# Patient Record
Sex: MALE | Race: ASIAN | URBAN - METROPOLITAN AREA
[De-identification: names, ages, dates, MRNs, and addresses within clinical notes are randomized per-mention and may not be internally consistent; named-entity substitution may affect disease eponyms.]

---

## 2023-02-21 ENCOUNTER — TELEPHONE ENCOUNTER (OUTPATIENT)
Dept: URBAN - METROPOLITAN AREA CLINIC 92 | Facility: CLINIC | Age: 58
End: 2023-02-21

## 2023-02-21 PROBLEM — 55341000119107: Status: ACTIVE | Noted: 2023-02-21

## 2023-02-21 NOTE — HPI-TODAY'S VISIT:
Patient is a 57-year-old male coming in for evaluation of liver disease issues at the request of Dr. Prudencio Julien MD. A copy of the note will be sent to the referring provider. Approximately 40 pages of material was reviewed. The patient is listed as having had a recent gastrointestinal illness on their February 21, 2023 visit with the primary while in [place] during the week of December.  Patient saw Dr. Jimenez on December 25 and was given a antibiotic that appeared to be cefixime 3 times a day for 5 days.  No appetite of been noted no nausea or diarrhea been noted but did have some bloating issues.  He saw Dr. Hunt and had some labs done and stool studies were negative and liver tests were up a little bit.  He had an ultrasound of the liver that was done that was reported to be negative. He was placed on Protonix and metronidazole but was not using the metronidazole.  Symptoms were improving. Home COVID test have been negative. He also had upper respiratory symptoms after returning.  Unclear if he had acquired something else. 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 Past surgeries include wisdom teeth extraction 1988. Medicines include pantoprazole 40 mg a day, levocarnitine 330 mg a day, lisinopril hydrochlorothiazide 20/12.5 once a day, multivitamin 50+ a day, Nasarel 200 mg a day, co-Q10 60 mg a day, aspirin 81 mg a day, B12 2500 mcg sublingual a day, Flonase 50 mcg nasal spray as needed. Allergies are to Cipro and penicillin class. Patient BMI listed as being obese at 30.9 with a weight of 191 and height of 5 foot 6. January 5, 2023 labs showed sodium 141 potassium 3.9 chloride 102 CO2 of 32 BUN of 20 creatinine 0.8 albumin 4.2 bilirubin 0.7 AST 69 ALT 63 alk phos 82 white cell count 6.7 hemoglobin 16.2 platelet count 377. January 5, 2023 ultrasound showed patient had a normal size liver contour and echogenicity no masses.  Main portal vein and hepatic veins patent.  Bile ducts were normal common bile duct 4 mm.  Gallbladder was normal with no gallstones.  Pancreas head and body appeared normal tail not seen due to gas.  Right kidney 12.3 cm with no hydronephrosis.  Thin-walled anechoic cyst seen in the lateral cortex of the right kidney measuring 1.8 x 1.2 x 1.5 cm.  No stones.  Left kidney 13.9 cm within walled cyst noted 5.2 x 4.2 x 5.2 cm.  Spleen was normal size.  No ascites was seen.  The cysts were felt to be simple..  It appears that the provider ordered a ferritin, ceruloplasmin alpha-1, iron sat, a COVID screen. They suspected a possible infection versus other effect. They had requested an EGD consideration. [facility] note of February 21, 2023 with results now show that the alpha-1 level was normal at 145.  Ceruloplasmin was 22.  C. difficile toxin/PCR was negative.  We saw additional labs from February 20, 2023 that showed an albumin of 4.1 alkaline phosphatase 217 AST that was noted to be 812 ALT 2280 total bilirubin 3.8. January 13, 2023 labs showed AST 30 ALT 59 alk phos 75 albumin 4.1 bilirubin 0.4. January 5, 2023 labs showed bilirubin 0.7 AST 69 ALT 63 alk phos 82. January 3, 2023 labs showed bilirubin 0.5 AST 51 ALT 51 alk phos 78. September 2, 2022 labs showed bilirubin 0.5 AST 17 ALT 24 alk phos 78.  Clearly a change noted. Other GI February 20th 2023 labs showed calcium 9.5 chloride 104 CO2 28 creatinine 0.9 potassium 4.3 sodium 138 and total protein 7.3. January 17 ferritin level was normal at 119.96. January 17, 2023 iron sat was normal at 23%.  January 17 INR was 0.91. Stool study January 4 showed Giardia lamblia antigen not seen and cryptosporidium antigen not detected.  Hep A IgM was negative hep B surface antigen was negative.  Hep C antibody was negative.  Lipase was 26 antimitochondrial antibody was less than 20.  Ova and parasite test was noted to be negative from January 4. Stool studies for Salmonella, Shigella, Campylobacter and Shiga toxin were negative also from January 4. January 5, 2023 ultrasound showed normal-sized liver contour and echogenicity no masses.  Main portal vein hepatic vein patent with appropriate flow, bile duct was 4 mm gallbladder was normal with no stones.  Pancreas head and body normal tail not seen.  Right kidney 12.3 cm with 1.8 cm cyst left kidney 13.9 cm with 5.2 cm cyst.  Spleen normal size.  Cyst felt to be simple. There is a note here from Dr. Dwight Lucia the mentions that the patient has a mitochondrial disorder and a secondary muscle weakness and fatigue and history of rhabdomyolysis.  This note was from October 23, 2013.  They felt that he had stabilized with the introduction of coenzyme Q 10 and COVID-19. I saw a note on January 6, 2023 from Dr. uLcia where she felt that the patient should avoid Cipro and Flagyl and Protonix should be okay with his condition.   1.	Abnormal liver labs/acute hepatitis clearly noted.  Patient with short incubation period felt ill in [place].  Was given some antibiotics for some GI symptoms.  Then developed some respiratory issues.  Mild bump in lab seen which led to his initial work-up that was negative and then has developed even worse labs now.  Certainly possible for medicines to cause injury in people who have mitochondrial issues but at the same time certainly possible also since he traveled to [place] that he could have acquired hepatitis E and that has a short incubation period of 30 or so days and can certainly cause an acute hepatitis that mimics many of the hep A pattern and is certainly endemic to that area of the world.  Needs hepatitis E testing as well as repeat labs, calculate MELD score, see if the labs are peak and start to come down.  If the labs are worsening needs to be admitted to the hospital and monitored inpatient.  If the labs are not worsening but remained with MELD score above 15 needs to be followed by the transplant team and assess from there accordingly.  Autoimmune screens can be done as well as quant immunoglobulins but the story is very suspicious to me for viral syndrome.  Can definitely check for same as well.  Other screens have already been done extensively by the team locally. 2.	Carnitine deficiency due to inborn error of metabolism history noted.  Patient with a mitochondrial issue and has been seen by Dr. Dwight Lucia for this and is on co-Q10 as well as carnitine's for same.  Certainly possibly of a mitochondrial issue that certain medications can interact and antibiotics are generally in the group of medicines he could do so.  Seems a little bit prolonged to have taken this long to manifest to me but certainly possible.  Follow-up laboratories. 3.	High risk medicine usage noted and on recent antibiotics and is clearly some type of delayed antibiotic reaction worsened by some medical history mitochondrial issues or if the patient has independent of same and acute viral hepatitis indigenous to the area [place] that he was visited. 4.	Vaccine counseling needed check for hep a and B immunity long-term consider vaccination for same. 5.	Occipital neuralgia history noted in chart. 6.	Upper respiratory symptoms noted and patient did COVID tests at home were negative and primary care doctor ordered COVID test the other day as well.  Unlikely for COVID to be manifesting with such a degree of hepatitis issue.  Seem more likely to be something he would have obtained closer to the event of the travel. 7.	Gastrointestinal infection and symptomatology likely could have been related to the hepatitis E and certainly possible.  May have led to the additional work-up as other stool studies were negative. 8.	Prostatitis history listed in chart. 9.	GI screening deferred to local doctors. 10.	Liver imaging noted to be negative for other acute findings and vessels patent. Plan 1.  Repeat labs to see if the trend is going up or down and do MELD score. 2.  While medication effects certainly are possible, patient also could be having hepatitis C which is not uncommon in the part of the world where he is at and it can cause a gastrointestinal illness and an acute hepatitis.  It has a short incubation period similar to what the patient is experiencing.  Treatment for this is generally supportive.  Hepatitis E testing is available. 3.  If labs are worsening or MELD is rising, needs to be seen by the transplant team and followed as there are rare cases of hepatitis E or other viral illnesses or medicine reactions that can potentially cause liver failure and those are important to be monitored for. 4.  By imaging does not appear that any chronic diseases present.   Duration of the visit was 0 min with 20 min of chart prep and 20 minutes for this face to face/TeleMed visit with time reviewing their prior and recent records and labs and discussing their current status and future plans for care for the patient.

## 2023-02-22 ENCOUNTER — LAB OUTSIDE AN ENCOUNTER (OUTPATIENT)
Dept: URBAN - METROPOLITAN AREA CLINIC 86 | Facility: CLINIC | Age: 58
End: 2023-02-22

## 2023-02-22 ENCOUNTER — WEB ENCOUNTER (OUTPATIENT)
Dept: URBAN - METROPOLITAN AREA CLINIC 86 | Facility: CLINIC | Age: 58
End: 2023-02-22

## 2023-02-22 ENCOUNTER — OFFICE VISIT (OUTPATIENT)
Dept: URBAN - METROPOLITAN AREA CLINIC 86 | Facility: CLINIC | Age: 58
End: 2023-02-22
Payer: SELF-PAY

## 2023-02-22 ENCOUNTER — TELEPHONE ENCOUNTER (OUTPATIENT)
Dept: URBAN - METROPOLITAN AREA CLINIC 92 | Facility: CLINIC | Age: 58
End: 2023-02-22

## 2023-02-22 VITALS
TEMPERATURE: 97.2 F | BODY MASS INDEX: 29.73 KG/M2 | WEIGHT: 185 LBS | HEIGHT: 66 IN | DIASTOLIC BLOOD PRESSURE: 86 MMHG | HEART RATE: 72 BPM | SYSTOLIC BLOOD PRESSURE: 139 MMHG

## 2023-02-22 DIAGNOSIS — Z79.899 HIGH RISK MEDICATION USE: ICD-10-CM

## 2023-02-22 DIAGNOSIS — E72.20 HYPERAMMONEMIA: ICD-10-CM

## 2023-02-22 DIAGNOSIS — R74.8 ABNORMAL LIVER ENZYMES: ICD-10-CM

## 2023-02-22 DIAGNOSIS — Z71.89 VACCINE COUNSELING: ICD-10-CM

## 2023-02-22 DIAGNOSIS — E88.40 MITOCHONDRIAL DISEASE: ICD-10-CM

## 2023-02-22 DIAGNOSIS — M54.81 OCCIPITAL NEURALGIA: ICD-10-CM

## 2023-02-22 DIAGNOSIS — J06.9 UPPER RESPIRATORY INFECTION: ICD-10-CM

## 2023-02-22 DIAGNOSIS — B17.9 ACUTE HEPATITIS: ICD-10-CM

## 2023-02-22 DIAGNOSIS — N41.9 PROSTATITIS: ICD-10-CM

## 2023-02-22 DIAGNOSIS — A09 GASTROINTESTINAL INFECTION: ICD-10-CM

## 2023-02-22 PROBLEM — 715852004 GASTROINTESTINAL INFECTION: Status: ACTIVE | Noted: 2023-02-21

## 2023-02-22 PROBLEM — 9360008 HYPERAMMONEMIA: Status: ACTIVE | Noted: 2023-02-22

## 2023-02-22 PROBLEM — 71760005 OCCIPITAL NEURALGIA: Status: ACTIVE | Noted: 2023-02-21

## 2023-02-22 PROBLEM — 37871000 ACUTE HEPATITIS: Status: ACTIVE | Noted: 2023-02-21

## 2023-02-22 PROBLEM — 54150009 UPPER RESPIRATORY INFECTION: Status: ACTIVE | Noted: 2023-02-21

## 2023-02-22 PROBLEM — 166643006 LIVER ENZYMES ABNORMAL: Status: ACTIVE | Noted: 2023-02-21

## 2023-02-22 PROBLEM — 161646004 H/O: HIGH RISK MEDICATION: Status: ACTIVE | Noted: 2023-02-21

## 2023-02-22 PROBLEM — 240096000 MITOCHONDRIAL DISEASE: Status: ACTIVE | Noted: 2023-02-21

## 2023-02-22 PROBLEM — 409063005 COUNSELING: Status: ACTIVE | Noted: 2023-02-21

## 2023-02-22 PROBLEM — 9713002 PROSTATITIS: Status: ACTIVE | Noted: 2023-02-21

## 2023-02-22 PROCEDURE — 99245 OFF/OP CONSLTJ NEW/EST HI 55: CPT

## 2023-02-22 RX ORDER — LISINOPRIL AND HYDROCHLOROTHIAZIDE 20; 12.5 MG/1; MG/1
1 TABLET TABLET ORAL ONCE A DAY
Status: ACTIVE | COMMUNITY

## 2023-02-22 RX ORDER — PANTOPRAZOLE SODIUM 40 MG/1
1 TABLET TABLET, DELAYED RELEASE ORAL TWICE A DAY
Status: ON HOLD | COMMUNITY

## 2023-02-22 RX ORDER — MECOBALAMIN 5000 MCG
0.5 LOZENGE ORAL PRN
Status: ACTIVE | COMMUNITY

## 2023-02-22 RX ORDER — PHENOL 1.4 %
AS DIRECTED AEROSOL, SPRAY (ML) MUCOUS MEMBRANE
Status: ACTIVE | COMMUNITY

## 2023-02-22 RX ORDER — LEVOCARNITINE 330 MG/1
3 TABLETS TABLET ORAL TWICE A DAY
Status: ACTIVE | COMMUNITY

## 2023-02-22 RX ORDER — GINSENG 100 MG
2 CAPSULE ORAL QD
Status: ACTIVE | COMMUNITY

## 2023-02-22 RX ORDER — FLUTICASONE PROPIONATE 50 UG/1
1 SPRAY IN EACH NOSTRIL SPRAY, METERED NASAL ONCE A DAY
Status: ACTIVE | COMMUNITY

## 2023-02-22 NOTE — HPI-TODAY'S VISIT:
Dear Roger Ni, Feb 22: Labs came back showing a sodium 137 potassium 4.1 chloride 103 CO2 27 BUN of 14 creatinine 0.8 calcium 9.5 glucose normal at 86 albumin 3.9 alkaline phosphatase 198  ALT 1368 total bilirubin 2.3 which was elevated.  No comparison, back on February 20 your AST had been 812 and ALT 2280 and total bilirubin 3.8 so they are clearly dropping. WBC was 4.8 hemoglobin 15.5 hematocrit up a little 48.2 platelet  count 320 MCV 87.8.  INR normal at 0.99 which is very important.  Your ammonia level was normal at 53. Neutrophils were 2.44 lymphocytes 1.88. Meld 10 and meld na 10. We also see the other test that are pending which include the hep B immunity as well as hep A immunity checks, the alpha-1 phenotype, the hep C screen, and the hepatitis E test as well. Spoke with pt re the labs and reviewed the labs with him and the trends. He will follow. Dr Lima Addendum: #1 Dear Roger Ni, More labs back from February 22.  Hep A IgM negative and had a immunity noted.  Unclear if this was from prior hep A vaccination or natural exposure. Hep B immunity noted at 5898. Other tests still pending. Dr. Lima

## 2023-02-22 NOTE — HPI-TODAY'S VISIT:
Patient is a 57-year-old male coming in for evaluation of liver disease issues at the request of Dr. Prudencio Julien MD.  A copy of the note will be sent to the referring provider.  Approximately 40 pages of material was reviewed. The patient is listed as having had a recent gastrointestinal illness end of dec 2022 and mentioned to their doctor here. While in [place] during the week of December.  Local doctor in [place] gave some abx and no sulfa due to mitochondrial issue and hydrated and felt better. Back and saw primary. Had been given a antibiotic that appeared to be cefixime 3 times a day for 5 days. Still stomach issues and lfts up and gave ppi and sent Dr Blum at . Had some bloating issues.    He saw Dr. Hunt and had some labs done and looked little lower and stool studies were negative and liver tests were still up a little bit. Stopped protonix pst 10 days. Feeling better.  He had an ultrasound of the liver that was done that was reported to be negative.  He was placed on Protonix and metronidazole but was not using the metronidazole.  Symptoms were improving.  Home COVID test have been negative.  pcr also neg.  He also had upper respiratory symptoms after returning.    Then went to [place] last week and says was cold and mitochondrial issue gets worse muscles with cold and not usual symptoms and fatigue and went back in last week and saw doctor covering and did labs and labs up.  Cpk was done and negative per pt. So sent in Dr Hillman told him to come see for this.  Had street food.  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  Past surgeries include wisdom teeth extraction 1988. Colonoscopy april 12 2023. He did cologuard.  Medicines include pantoprazole 40 mg a day, levocarnitine 330 mg a day, lisinopril hydrochlorothiazide 20/12.5 once a day, multivitamin 50+ a day, Nasarel 200 mg a day, co-Q10 60 mg a day, aspirin 81 mg a day, B12 2500 mcg sublingual a day, Flonase 50 mcg nasal spray as needed.  Allergies are to Cipro and penicillin class.  Patient BMI listed as being obese at 30.9 with a weight of 191 and height of 5 foot 6.  Losing weight on purpose and stomach issues.  January 5, 2023 labs showed sodium 141 potassium 3.9 chloride 102 CO2 of 32 BUN of 20 creatinine 0.8 albumin 4.2 bilirubin 0.7 AST 69 ALT 63 alk phos 82 white cell count 6.7 hemoglobin 16.2 platelet count 377.  January 5, 2023 ultrasound showed patient had a normal size liver contour and echogenicity no masses.  Main portal vein and hepatic veins patent.  Bile ducts were normal common bile duct 4 mm.  Gallbladder was normal with no gallstones.  Pancreas head and body appeared normal tail not seen due to gas.  Right kidney 12.3 cm with no hydronephrosis.  Thin-walled anechoic cyst seen in the lateral cortex of the right kidney measuring 1.8 x 1.2 x 1.5 cm.  No stones.  Left kidney 13.9 cm within walled cyst noted 5.2 x 4.2 x 5.2 cm.  Spleen was normal size.  No ascites was seen.  The cysts were felt to be simple.  It appears that the provider ordered a ferritin, ceruloplasmin alpha-1, iron sat, a COVID screen. They suspected a possible infection versus other effect. They had requested an EGD consideration. To do egd and colon in april.  [facility] note of February 2023 with results now show that the alpha-1 level was normal at 145.  Ceruloplasmin was 22.  C. difficile toxin/PCR was negative.  We saw additional labs from February 20, 2023 that showed an albumin of 4.1 alkaline phosphatase 217 AST that was noted to be 812 ALT 2280 total bilirubin 3.8.  January 13, 2023 labs showed AST 30 ALT 59 alk phos 75 albumin 4.1 bilirubin 0.4.  January 5, 2023 labs showed bilirubin 0.7 AST 69 ALT 63 alk phos 82. January 3, 2023 labs showed bilirubin 0.5 AST 51 ALT 51 alk phos 78.  September 2, 2022 labs showed bilirubin 0.5 AST 17 ALT 24 alk phos 78.  Clearly a change noted.   GI February 20th 2023 labs showed calcium 9.5 chloride 104 CO2 28 creatinine 0.9 potassium 4.3 sodium 138 and total protein 7.3.  January 17 ferritin level was normal at 119.96. January 17, 2023 iron sat was normal at 23%.  January 17 INR was 0.91. Stool study January 4 showed Giardia lamblia antigen not seen and cryptosporidium antigen not detected.  Hep A IgM was negative hep B surface antigen was negative.  Hep C antibody was negative.  Lipase was 26 antimitochondrial antibody was less than 20.  Ova and parasite test was noted to be negative from January 4. Stool studies for Salmonella, Shigella, Campylobacter and Shiga toxin were negative also from January 4.  January 5, 2023 ultrasound showed normal-sized liver contour and echogenicity no masses.  Main portal vein hepatic vein patent with appropriate flow, bile duct was 4 mm gallbladder was normal with no stones.  Pancreas head and body normal tail not seen.  Right kidney 12.3 cm with 1.8 cm cyst left kidney 13.9 cm with 5.2 cm cyst.  Spleen normal size.  Cyst felt to be simple.  There is a note here from Dr. Dwight Lucia the mentions that the patient has a mitochondrial disorder and a secondary muscle weakness and fatigue and history of rhabdomyolysis.  This note was from October 23, 2013.  They felt that he had stabilized with the introduction of coenzyme Q 10 and COVID-19.  I saw a note on January 6, 2023 from Dr. Lucia where she felt that the patient should avoid Cipro and Flagyl and Protonix should be okay with his condition.    Plan 1.  Repeat labs to see if the trend is going up or down and do MELD score. 2.  While medication delayed effects certainly are possible, seems far out to be the case. Since did travel,  patient also could be having acute hepatitis E which is not uncommon in the part of the world where he is at and it can cause a gastrointestinal illness and an acute hepatitis.  It has a short incubation period similar to what the patient is experiencing 30-60 days ave 40 days.  Treatment for this is generally supportive.  Hepatitis E testing is available. 3.  If labs are worsening or MELD is rising, needs to be seen by the transplant team and followed as there are rare cases of hepatitis E or other viral illnesses or medicine reactions that can potentially cause liver failure and those are important to be monitored for. 4.  By imaging does not appear that any chronic disease present.   Duration of the visit was 85 min with 50 min of chart prep and 35 minutes for this face to face visit with time reviewing their prior and recent records and labs and discussing their current status and future plans for care for the patient.

## 2023-02-22 NOTE — EXAM-PHYSICAL EXAM
Gen: awake and responsive. Eyes: min icteric, normal lids. Mouth: covered with mask. Nose: covered with mask. Hearing: intact grossly. Neck: trachea midline and no jvd. CV: RRR no s3. Lungs: clear. No wheezes, Abd: Soft, nabs, nr, NT. No hsm. Ext: no sig edema, some palm erythema. Neuro: moves all 4 ext grossly. No asterixis. Skin: some pruritis and some palm erythema. no

## 2023-02-25 ENCOUNTER — WEB ENCOUNTER (OUTPATIENT)
Dept: URBAN - METROPOLITAN AREA CLINIC 86 | Facility: CLINIC | Age: 58
End: 2023-02-25

## 2023-02-25 NOTE — HPI-TODAY'S VISIT:
Addressed To: Víctor Lmia  Hi Doc, I know you order the hep panel for Monday but I am traveling for a few days and went to [place] yesterday.  Attached are the results - appear to be encouraging, another 50% drop in levels from Wednesday when I came to see you  ALT	684.0 IU/L	  AST	104 IU/L	  ALKALINE PHOSPHATASE	158 IU/L	  BILIRUBIN, TOTAL	1.4 mg/dL	  BILIRUBIN, DIRECT	0.48 mg/dL	  TOTAL PROTEIN	6.9 g/dl	  ALBUMIN	3.7 g/dl	  ALBUMIN/GLOBULIN RATIO, SERUM/PLASMA	1.2  Collected: 02/24/2023 2:35 PM from [facility]  Resulted: 02/24/2023 10:00 PM  Result status: Final result     Elsa						    _____________________________________________________________________ To :ELSA NI From : Sent :02/23/2023 06:54 PM Subject :more tests  Dear Elsa Ni, More labs back from February 22. Hep A IgM negative and had a immunity noted. Unclear if this was from prior hep A vaccination or natural exposure. Hep B immunity noted at 5898. Other tests still pending. Dr. Lima

## 2023-02-26 ENCOUNTER — TELEPHONE ENCOUNTER (OUTPATIENT)
Dept: URBAN - METROPOLITAN AREA CLINIC 92 | Facility: CLINIC | Age: 58
End: 2023-02-26

## 2023-02-26 NOTE — HPI-TODAY'S VISIT:
Dear Roger Ni, Feb 22: more labs: [place] negative 10.5 and rules out another immune issue. Still pending a few tests including the much awaiting Hepatitis E test. Dr Lima

## 2023-02-27 ENCOUNTER — LAB OUTSIDE AN ENCOUNTER (OUTPATIENT)
Dept: URBAN - METROPOLITAN AREA CLINIC 86 | Facility: CLINIC | Age: 58
End: 2023-02-27

## 2023-02-27 ENCOUNTER — TELEPHONE ENCOUNTER (OUTPATIENT)
Dept: URBAN - METROPOLITAN AREA CLINIC 92 | Facility: CLINIC | Age: 58
End: 2023-02-27

## 2023-02-27 NOTE — HPI-TODAY'S VISIT:
Dear Roger Ni, Hepatitis E IGM positive and hepatitis E IGG also positive and so this confirms that you had recent hepatitis E. Important to mention to others who attended that with you. Higher risk is reported to be for female patients who are pregnant at the time of the exposure. Alpha one M1M1 normal and level of 190. One last test to go but the HEV was the main test. Spoke with pt and he is now aware and labs dropping. Dr Lima Addendum: 3/3/2023 Dear Roger Ni, I believe this was the final pending test from February 22 which was a hep C PCR quantitative that came back negative to confirm that there was no acute hepatitis C as an issue. As you recall focus is now on hepatitis E which was since confirmed. Dr. Lima

## 2023-03-04 ENCOUNTER — WEB ENCOUNTER (OUTPATIENT)
Dept: URBAN - METROPOLITAN AREA CLINIC 86 | Facility: CLINIC | Age: 58
End: 2023-03-04

## 2023-03-04 NOTE — HPI-TODAY'S VISIT:
Addressed To: Víctor Lima Doc,   Hep Panel from yesterday - almost there!  ALBUMIN  4.0 ALBUMIN/GLOBULIN RATIO, SERUM/PLASMA  1.2 ALKALINE PHOSPHATASE   121 ALT  160.0 AST  34 BILIRUBIN, DIRECT  0.28 BILIRUBIN, TOTAL   1.2 TOTAL PROTEIN       7.3   _____________________________________________________________________ To :ELSA NI From : Sent :03/03/2023 09:25 AM Subject :final test back and negative also  Dear Elsa Ni, I believe this was the final pending test from February 22 which was a hep C PCR quantitative that came back negative to confirm that there was no acute hepatitis C as an issue. As you recall focus is now on hepatitis E which was since confirmed. Dr. Lima

## 2023-03-06 ENCOUNTER — LAB OUTSIDE AN ENCOUNTER (OUTPATIENT)
Dept: URBAN - METROPOLITAN AREA CLINIC 86 | Facility: CLINIC | Age: 58
End: 2023-03-06

## 2023-03-13 ENCOUNTER — OFFICE VISIT (OUTPATIENT)
Dept: URBAN - METROPOLITAN AREA CLINIC 86 | Facility: CLINIC | Age: 58
End: 2023-03-13

## 2023-03-26 PROBLEM — 235867002: Status: ACTIVE | Noted: 2023-03-26

## 2023-03-27 ENCOUNTER — WEB ENCOUNTER (OUTPATIENT)
Dept: URBAN - METROPOLITAN AREA CLINIC 86 | Facility: CLINIC | Age: 58
End: 2023-03-27

## 2023-03-27 NOTE — HPI-TODAY'S VISIT:
Dear Roger Ni, Thanks for sending the labs: recent March 2023 labs: Alb 4.0 and alk 94 and ast 22 and alt 37 (ideal alt is less than 35 so almost there) and total bili 0.8 and db 0.09 and total protein 6.9. Good to see labs be this low now. In lab normal but ideal normal less than 35. Dr Lima

## 2023-04-06 ENCOUNTER — TELEPHONE ENCOUNTER (OUTPATIENT)
Dept: URBAN - METROPOLITAN AREA CLINIC 86 | Facility: CLINIC | Age: 58
End: 2023-04-06

## 2023-04-06 NOTE — HPI-TODAY'S VISIT:
Dear Roger Ni, Thank you for sending the most recent labs from April 5. Albumin normal at 4.0, alkaline phosphatase normal at 92.  ALT normal at 36 with ideal ALT less than 35. AST down to 23. Total bilirubin was 0.5 down from 0.8.  Direct bilirubin 0.04 so primarily indirect.  Total protein 6.6. Thank you again for sending us these results.  Very happy to see that you continue to improve on the liver labs. Dr. Lima

## 2023-04-07 ENCOUNTER — OFFICE VISIT (OUTPATIENT)
Dept: URBAN - METROPOLITAN AREA CLINIC 86 | Facility: CLINIC | Age: 58
End: 2023-04-07
Payer: SELF-PAY

## 2023-04-07 VITALS
SYSTOLIC BLOOD PRESSURE: 143 MMHG | HEIGHT: 66 IN | WEIGHT: 189 LBS | DIASTOLIC BLOOD PRESSURE: 87 MMHG | TEMPERATURE: 97.1 F | HEART RATE: 62 BPM | BODY MASS INDEX: 30.37 KG/M2

## 2023-04-07 DIAGNOSIS — A09 GASTROINTESTINAL INFECTION: ICD-10-CM

## 2023-04-07 DIAGNOSIS — M54.81 OCCIPITAL NEURALGIA: ICD-10-CM

## 2023-04-07 DIAGNOSIS — E72.20 HYPERAMMONEMIA: ICD-10-CM

## 2023-04-07 DIAGNOSIS — R74.8 ABNORMAL LIVER ENZYMES: ICD-10-CM

## 2023-04-07 DIAGNOSIS — Z79.899 HIGH RISK MEDICATION USE: ICD-10-CM

## 2023-04-07 DIAGNOSIS — Z71.89 VACCINE COUNSELING: ICD-10-CM

## 2023-04-07 DIAGNOSIS — J06.9 UPPER RESPIRATORY INFECTION: ICD-10-CM

## 2023-04-07 DIAGNOSIS — E88.40 MITOCHONDRIAL DISEASE: ICD-10-CM

## 2023-04-07 DIAGNOSIS — B17.2 ACUTE HEPATITIS E: ICD-10-CM

## 2023-04-07 DIAGNOSIS — N41.9 PROSTATITIS: ICD-10-CM

## 2023-04-07 PROCEDURE — 99215 OFFICE O/P EST HI 40 MIN: CPT

## 2023-04-07 RX ORDER — FLUTICASONE PROPIONATE 50 UG/1
1 SPRAY IN EACH NOSTRIL SPRAY, METERED NASAL ONCE A DAY
Status: ACTIVE | COMMUNITY

## 2023-04-07 RX ORDER — PHENOL 1.4 %
AS DIRECTED AEROSOL, SPRAY (ML) MUCOUS MEMBRANE
Status: ACTIVE | COMMUNITY

## 2023-04-07 RX ORDER — MECOBALAMIN 5000 MCG
0.5 LOZENGE ORAL PRN
Status: ACTIVE | COMMUNITY

## 2023-04-07 RX ORDER — LEVOCARNITINE 330 MG/1
3 TABLETS TABLET ORAL TWICE A DAY
Status: ACTIVE | COMMUNITY

## 2023-04-07 RX ORDER — GINSENG 100 MG
2 CAPSULE ORAL QD
Status: ACTIVE | COMMUNITY

## 2023-04-07 RX ORDER — PANTOPRAZOLE SODIUM 40 MG/1
1 TABLET TABLET, DELAYED RELEASE ORAL TWICE A DAY
Status: ON HOLD | COMMUNITY

## 2023-04-07 RX ORDER — LISINOPRIL AND HYDROCHLOROTHIAZIDE 20; 12.5 MG/1; MG/1
1 TABLET TABLET ORAL ONCE A DAY
Status: ACTIVE | COMMUNITY

## 2023-04-07 NOTE — HPI-TODAY'S VISIT:
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 Prudencio Julien MD.  A copy of the note will be sent to the referring provider.  Good news is that he had HEV and nearly to ideal now.  I would redo the in 3m.  HEV is acute virus and it is not chronic unless ISP noted.  Pt says he cannot take sulfur based meds.  He asked re TD and options and he will check if he can use xifaxin.  April 5. Albumin normal at 4.0, alkaline phosphatase normal at 92.  ALT normal at 36 with ideal ALT less than 35. AST down to 23. Total bilirubin was 0.5 down from 0.8.  Direct bilirubin 0.04 so primarily indirect.  Total protein 6.6.  ALBUMIN  4.0 ALBUMIN/GLOBULIN RATIO, SERUM/PLASMA  1.2 ALKALINE PHOSPHATASE   121 ALT  160.0 AST  34 BILIRUBIN, DIRECT  0.28 BILIRUBIN, TOTAL   1.2 TOTAL PROTEIN       7.3   February 22 which was a hep C PCR quantitative that came back negative to confirm that there was no acute hepatitis C as an issue. As you recall focus is now on hepatitis E which was since confirmed. Dr. Lima Dear   Hepatitis E IGM positive and hepatitis E IGG also positive and so this confirms that you had recent hepatitis E. Important to mention to others who attended that with you. Higher risk is reported to be for female patients who are pregnant at the time of the exposure. Alpha one M1M1 normal and level of 190. One last test to go but the HEV was the main test.  I believe this was the final pending test from February 22 which was a hep C PCR quantitative that came back negative to confirm that there was no acute hepatitis C as an issue. As you recall focus is now on hepatitis E which was since confirmed.  Feb 22:  Wilton negative 10.5 and rules out another immune issue.  ALT	684.0 IU/L	  AST	104 IU/L	  ALKALINE PHOSPHATASE	158 IU/L	  BILIRUBIN, TOTAL	1.4 mg/dL	  BILIRUBIN, DIRECT	0.48 mg/dL	  TOTAL PROTEIN	6.9 g/dl	  ALBUMIN	3.7 g/dl	  ALBUMIN/GLOBULIN RATIO, SERUM/PLASMA	1.2  Collected: 02/24/2023 2:35 PM from [facility]  Resulted: 02/24/2023 10:00 PM  February 22. Hep A IgM negative and had a immunity noted. Unclear if this was from prior hep A vaccination or natural exposure. Hep B immunity noted at 5898. Other tests still pending. Dr. Lima Dear Roger Ni, Feb 22: Labs came back showing a sodium 137 potassium 4.1 chloride 103 CO2 27 BUN of 14 creatinine 0.8 calcium 9.5 glucose normal at 86 albumin 3.9 alkaline phosphatase 198  ALT 1368 total bilirubin 2.3 which was elevated.  No comparison, back on February 20 your AST had been 812 and ALT 2280 and total bilirubin 3.8 so they are clearly dropping. WBC was 4.8 hemoglobin 15.5 hematocrit up a little 48.2 platelet  count 320 MCV 87.8.  INR normal at 0.99 which is very important.  Your ammonia level was normal at 53. Neutrophils were 2.44 lymphocytes 1.88. Meld 10 and meld na 10. We also see the other test that are pending which include the hep B immunity as well as hep A immunity checks, the alpha-1 phenotype, the hep C screen, and the hepatitis E test as well.  February 22.  Hep A IgM negative and had a immunity noted.  Unclear if this was from prior hep A vaccination or natural exposure. Hep B immunity noted at 5898.   Prior Approximately 40 pages of material was reviewed. The patient is listed as having had a recent gastrointestinal illness end of dec 2022 and mentioned to their doctor here. While in [place] during the week of December.  Local doctor in [place] gave some abx and no sulfa due to mitochondrial issue and hydrated and felt better. Back and saw primary. Had been given a antibiotic that appeared to be cefixime 3 times a day for 5 days. Still stomach issues and lfts up and gave ppi and sent Dr Blum at . Had some bloating issues.    He saw Dr. Hunt and had some labs done and looked little lower and stool studies were negative and liver tests were still up a little bit. Stopped protonix pst 10 days. Feeling better.  He had an ultrasound of the liver that was done that was reported to be negative.  He was placed on Protonix and metronidazole but was not using the metronidazole.  Symptoms were improving.  Home COVID test have been negative.  pcr also neg.  He also had upper respiratory symptoms after returning.    Then went to [place] last week and says was cold and mitochondrial issue gets worse muscles with cold and not usual symptoms and fatigue and went back in last week and saw doctor covering and did labs and labs up.  Cpk was done and negative per pt. So sent in Dr Hillman told him to come see for this.  Had street food.  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  Past surgeries include wisdom teeth extraction 1988. Colonoscopy april 12 2023. He did cologuard.  Medicines include pantoprazole 40 mg a day, levocarnitine 330 mg a day, lisinopril hydrochlorothiazide 20/12.5 once a day, multivitamin 50+ a day, Nasarel 200 mg a day, co-Q10 60 mg a day, aspirin 81 mg a day, B12 2500 mcg sublingual a day, Flonase 50 mcg nasal spray as needed.  Allergies are to Cipro and penicillin class.  Patient BMI listed as being obese at 30.9 with a weight of 191 and height of 5 foot 6.  Losing weight on purpose and stomach issues.  January 5, 2023 labs showed sodium 141 potassium 3.9 chloride 102 CO2 of 32 BUN of 20 creatinine 0.8 albumin 4.2 bilirubin 0.7 AST 69 ALT 63 alk phos 82 white cell count 6.7 hemoglobin 16.2 platelet count 377.  January 5, 2023 ultrasound showed patient had a normal size liver contour and echogenicity no masses.  Main portal vein and hepatic veins patent.  Bile ducts were normal common bile duct 4 mm.  Gallbladder was normal with no gallstones.  Pancreas head and body appeared normal tail not seen due to gas.  Right kidney 12.3 cm with no hydronephrosis.  Thin-walled anechoic cyst seen in the lateral cortex of the right kidney measuring 1.8 x 1.2 x 1.5 cm.  No stones.  Left kidney 13.9 cm within walled cyst noted 5.2 x 4.2 x 5.2 cm.  Spleen was normal size.  No ascites was seen.  The cysts were felt to be simple.  It appears that the provider ordered a ferritin, ceruloplasmin alpha-1, iron sat, a COVID screen. They suspected a possible infection versus other effect. They had requested an EGD consideration. To do egd and colon in april.  [facility] note of February 2023 with results now show that the alpha-1 level was normal at 145.  Ceruloplasmin was 22.  C. difficile toxin/PCR was negative.  We saw additional labs from February 20, 2023 that showed an albumin of 4.1 alkaline phosphatase 217 AST that was noted to be 812 ALT 2280 total bilirubin 3.8.  January 13, 2023 labs showed AST 30 ALT 59 alk phos 75 albumin 4.1 bilirubin 0.4.  January 5, 2023 labs showed bilirubin 0.7 AST 69 ALT 63 alk phos 82. January 3, 2023 labs showed bilirubin 0.5 AST 51 ALT 51 alk phos 78.  September 2, 2022 labs showed bilirubin 0.5 AST 17 ALT 24 alk phos 78.  Clearly a change noted.   GI February 20th 2023 labs showed calcium 9.5 chloride 104 CO2 28 creatinine 0.9 potassium 4.3 sodium 138 and total protein 7.3.  January 17 ferritin level was normal at 119.96. January 17, 2023 iron sat was normal at 23%.  January 17 INR was 0.91. Stool study January 4 showed Giardia lamblia antigen not seen and cryptosporidium antigen not detected.  Hep A IgM was negative hep B surface antigen was negative.  Hep C antibody was negative.  Lipase was 26 antimitochondrial antibody was less than 20.  Ova and parasite test was noted to be negative from January 4. Stool studies for Salmonella, Shigella, Campylobacter and Shiga toxin were negative also from January 4.  January 5, 2023 ultrasound showed normal-sized liver contour and echogenicity no masses.  Main portal vein hepatic vein patent with appropriate flow, bile duct was 4 mm gallbladder was normal with no stones.  Pancreas head and body normal tail not seen.  Right kidney 12.3 cm with 1.8 cm cyst left kidney 13.9 cm with 5.2 cm cyst.  Spleen normal size.  Cyst felt to be simple.  There is a note here from Dr. Dwight Lucia the mentions that the patient has a mitochondrial disorder and a secondary muscle weakness and fatigue and history of rhabdomyolysis.  This note was from October 23, 2013.  They felt that he had stabilized with the introduction of coenzyme Q 10 and COVID-19.  I saw a note on January 6, 2023 from Dr. Lucia where she felt that the patient should avoid Cipro and Flagyl and Protonix should be okay with his condition.    Plan 1.  Pt has done well. He is doing well and he had uncomplicated course. 2. Not chronic in most and few case reports of this chronicity in transplant. 3. Plan for the labs in  and telemed. 4. We will then not see after that.  Duration of the visit was 42 min with 10 min of chart prep and 32 minutes for this face to face visit with time reviewing their prior and recent records and labs and discussing their current status and future plans for care for the patient.

## 2023-04-07 NOTE — EXAM-PHYSICAL EXAM
Gen: awake and responsive. Eyes: anicteric, normal lids. Mouth: normal lips and no thrush Nose: no drianage Hearing: intact grossly. Neck: trachea midline and no jvd. CV: RRR no s3. Lungs: clear. No wheezes, Abd: Soft, nabs, nr, NT. No hsm. Ext: no sig edema, no sig palm erythema. Neuro: moves all 4 ext grossly. No asterixis. Skin: no pruritis and no sig palm erythema.

## 2023-06-18 ENCOUNTER — TELEPHONE ENCOUNTER (OUTPATIENT)
Dept: URBAN - METROPOLITAN AREA CLINIC 86 | Facility: CLINIC | Age: 58
End: 2023-06-18

## 2023-06-18 NOTE — HPI-TODAY'S VISIT:
Dear Roger Ni, Thank you for sending us your recent June 2023  colonoscopy report.  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  They want to do a follow-up flexible sigmoidoscopy to go back and cauterized the site.  The other polyp they state that was removed was an adenoma and for that they recommend a 5-year colonoscopy.  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   We will have this information added to your chart please thank Dr. Ruffin for sending this information to share. They also sent your EGD report from same day which stated that she had a normal esophagus, stomach and duodenum. Thanks again for info. Dr Lima

## 2023-07-01 ENCOUNTER — LAB OUTSIDE AN ENCOUNTER (OUTPATIENT)
Dept: URBAN - METROPOLITAN AREA CLINIC 86 | Facility: CLINIC | Age: 58
End: 2023-07-01

## 2023-07-07 ENCOUNTER — DASHBOARD ENCOUNTERS (OUTPATIENT)
Age: 58
End: 2023-07-07

## 2023-07-07 ENCOUNTER — OFFICE VISIT (OUTPATIENT)
Dept: URBAN - METROPOLITAN AREA TELEHEALTH 2 | Facility: TELEHEALTH | Age: 58
End: 2023-07-07
Payer: SELF-PAY

## 2023-07-07 VITALS — BODY MASS INDEX: 31.34 KG/M2 | WEIGHT: 195 LBS | HEIGHT: 66 IN

## 2023-07-07 DIAGNOSIS — R74.8 ABNORMAL LIVER ENZYMES: ICD-10-CM

## 2023-07-07 DIAGNOSIS — Z86.010 HISTORY OF COLON POLYPS: ICD-10-CM

## 2023-07-07 DIAGNOSIS — B17.2 ACUTE HEPATITIS E: ICD-10-CM

## 2023-07-07 PROCEDURE — 99214 OFFICE O/P EST MOD 30 MIN: CPT

## 2023-07-07 RX ORDER — PANTOPRAZOLE SODIUM 40 MG/1
1 TABLET TABLET, DELAYED RELEASE ORAL TWICE A DAY
Status: DISCONTINUED | COMMUNITY

## 2023-07-07 RX ORDER — MECOBALAMIN 5000 MCG
0.5 LOZENGE ORAL PRN
Status: ACTIVE | COMMUNITY

## 2023-07-07 RX ORDER — ASPIRIN 81 MG/1
1 TABLET TABLET, COATED ORAL TWICE A DAY
Status: ACTIVE | COMMUNITY

## 2023-07-07 RX ORDER — LISINOPRIL AND HYDROCHLOROTHIAZIDE 20; 12.5 MG/1; MG/1
1 TABLET TABLET ORAL ONCE A DAY
Status: ACTIVE | COMMUNITY

## 2023-07-07 RX ORDER — ACETYLCYSTEINE 600 MG
AS DIRECTED CAPSULE ORAL ONCE A DAY
Status: ACTIVE | COMMUNITY

## 2023-07-07 RX ORDER — PHENOL 1.4 %
AS DIRECTED AEROSOL, SPRAY (ML) MUCOUS MEMBRANE
Status: ACTIVE | COMMUNITY

## 2023-07-07 RX ORDER — GINSENG 100 MG
2 CAPSULE ORAL QD
Status: ACTIVE | COMMUNITY

## 2023-07-07 RX ORDER — FLUTICASONE PROPIONATE 50 UG/1
1 SPRAY IN EACH NOSTRIL SPRAY, METERED NASAL ONCE A DAY
Status: ACTIVE | COMMUNITY

## 2023-07-07 RX ORDER — LEVOCARNITINE 330 MG/1
3 TABLETS TABLET ORAL TWICE A DAY
Status: ACTIVE | COMMUNITY

## 2023-07-07 NOTE — HPI-TODAY'S VISIT:
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 Prudencio Julien MD.  A copy of the note will be sent to the referring provider.  Pt is recooperating well from the HEV.  June 2023  colonoscopy report.  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  They want to do a follow-up flexible sigmoidoscopy to go back and cauterized the site and to do that in late July 2023.  The other polyp they state that was removed was an adenoma and for that they recommend a 5-year colonoscopy.   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    We will have this information added to your chart please thank Dr. Ruffin for sending this information to share.  EGD report from same day which stated that he had a normal esophagus, stomach and duodenum.  HEV : he is feels a chronic fatigue and so not able to easilyt separate the symptoms from that,  He understands that this is and acute hepatitis virus and it is not chronic unless ISP noted.  Needs labs and he will send us the labs.  April 5. Albumin normal at 4.0, alkaline phosphatase normal at 92.  ALT normal at 36 with ideal ALT less than 35. AST down to 23. Total bilirubin was 0.5 down from 0.8.  Direct bilirubin 0.04 so primarily indirect.  Total protein 6.6.  ALBUMIN  4.0 ALBUMIN/GLOBULIN RATIO, SERUM/PLASMA  1.2 ALKALINE PHOSPHATASE   121 ALT  160.0 AST  34 BILIRUBIN, DIRECT  0.28 BILIRUBIN, TOTAL   1.2 TOTAL PROTEIN       7.3   February 22 which was a hep C PCR quantitative that came back negative to confirm that there was no acute hepatitis C as an issue. As you recall focus is now on hepatitis E which was since confirmed. Dr. Lima Dear   Hepatitis E IGM positive and hepatitis E IGG also positive and so this confirms that you had recent hepatitis E. Important to mention to others who attended that with you. Higher risk is reported to be for female patients who are pregnant at the time of the exposure.  Alpha one M1M1 normal and level of 190.  I believe this was the final pending test from February 22 which was a hep C PCR quantitative that came back negative to confirm that there was no acute hepatitis C as an issue.  Feb 22:  [place] negative 10.5 and rules out another immune issue.  ALT	684.0 IU/L	  AST	104 IU/L	  ALKALINE PHOSPHATASE	158 IU/L	  BILIRUBIN, TOTAL	1.4 mg/dL	  BILIRUBIN, DIRECT	0.48 mg/dL	  TOTAL PROTEIN	6.9 g/dl	  ALBUMIN	3.7 g/dl	  ALBUMIN/GLOBULIN RATIO, SERUM/PLASMA	1.2  Collected: 02/24/2023 2:35 PM from [facility]  Resulted: 02/24/2023 10:00 PM  February 22. Hep A IgM negative and had a immunity noted. Unclear if this was from prior hep A vaccination or natural exposure. Hep B immunity noted at 5898. Other tests still pending. , Feb 22: Labs came back showing a sodium 137 potassium 4.1 chloride 103 CO2 27 BUN of 14 creatinine 0.8 calcium 9.5 glucose normal at 86 albumin 3.9 alkaline phosphatase 198  ALT 1368 total bilirubin 2.3 which was elevated.  No comparison, back on February 20 your AST had been 812 and ALT 2280 and total bilirubin 3.8 so they are clearly dropping. WBC was 4.8 hemoglobin 15.5 hematocrit up a little 48.2 platelet  count 320 MCV 87.8.  INR normal at 0.99 which is very important.  Your ammonia level was normal at 53. Neutrophils were 2.44 lymphocytes 1.88. Meld 10 and meld na 10. We also see the other test that are pending which include the hep B immunity as well as hep A immunity checks, the alpha-1 phenotype, the hep C screen, and the hepatitis E test as well.  February 22.  Hep A IgM negative and had a immunity noted.  Unclear if this was from prior hep A vaccination or natural exposure. Hep B immunity noted at 5898.   Prior Approximately 40 pages of material was reviewed. The patient is listed as having had a recent gastrointestinal illness end of dec 2022 and mentioned to their doctor here. While in [place] during the week of December.  Local doctor in [place] gave some abx and no sulfa due to mitochondrial issue and hydrated and felt better. Back and saw primary. Had been given a antibiotic that appeared to be cefixime 3 times a day for 5 days. Still stomach issues and lfts up and gave ppi and sent Dr Blum at . Had some bloating issues.    He saw Dr. Hunt and had some labs done and looked little lower and stool studies were negative and liver tests were still up a little bit. Stopped protonix pst 10 days. Feeling better.  He had an ultrasound of the liver that was done that was reported to be negative.  He was placed on Protonix and metronidazole but was not using the metronidazole.  Symptoms were improving.  Home COVID test have been negative. KP pcr also neg.  He also had upper respiratory symptoms after returning.    Then went to [place] last week and says was cold and mitochondrial issue gets worse muscles with cold and not usual symptoms and fatigue and went back in last week and saw doctor covering and did labs and labs up.  Cpk was done and negative per pt. So sent in Dr Hillman told him to come see for this.  Had street food.  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  Past surgeries include wisdom teeth extraction 1988. Colonoscopy april 12 2023. He did cologuard.  Medicines include pantoprazole 40 mg a day, levocarnitine 330 mg a day, lisinopril hydrochlorothiazide 20/12.5 once a day, multivitamin 50+ a day, Nasarel 200 mg a day, co-Q10 60 mg a day, aspirin 81 mg a day, B12 2500 mcg sublingual a day, Flonase 50 mcg nasal spray as needed.  Allergies are to Cipro and penicillin class.  Patient BMI listed as being obese at 30.9 with a weight of 191 and height of 5 foot 6.  Losing weight on purpose and stomach issues.  January 5, 2023 labs showed sodium 141 potassium 3.9 chloride 102 CO2 of 32 BUN of 20 creatinine 0.8 albumin 4.2 bilirubin 0.7 AST 69 ALT 63 alk phos 82 white cell count 6.7 hemoglobin 16.2 platelet count 377.  January 5, 2023 ultrasound showed patient had a normal size liver contour and echogenicity no masses.  Main portal vein and hepatic veins patent.  Bile ducts were normal common bile duct 4 mm.  Gallbladder was normal with no gallstones.  Pancreas head and body appeared normal tail not seen due to gas.  Right kidney 12.3 cm with no hydronephrosis.  Thin-walled anechoic cyst seen in the lateral cortex of the right kidney measuring 1.8 x 1.2 x 1.5 cm.  No stones.  Left kidney 13.9 cm within walled cyst noted 5.2 x 4.2 x 5.2 cm.  Spleen was normal size.  No ascites was seen.  The cysts were felt to be simple.  It appears that the provider ordered a ferritin, ceruloplasmin alpha-1, iron sat, a COVID screen. They suspected a possible infection versus other effect. They had requested an EGD consideration. To do egd and colon in april.  [facility] note of February 2023 with results now show that the alpha-1 level was normal at 145.  Ceruloplasmin was 22.  C. difficile toxin/PCR was negative.  We saw additional labs from February 20, 2023 that showed an albumin of 4.1 alkaline phosphatase 217 AST that was noted to be 812 ALT 2280 total bilirubin 3.8.  January 13, 2023 labs showed AST 30 ALT 59 alk phos 75 albumin 4.1 bilirubin 0.4.  January 5, 2023 labs showed bilirubin 0.7 AST 69 ALT 63 alk phos 82. January 3, 2023 labs showed bilirubin 0.5 AST 51 ALT 51 alk phos 78.  September 2, 2022 labs showed bilirubin 0.5 AST 17 ALT 24 alk phos 78.  Clearly a change noted.   GI February 20th 2023 labs showed calcium 9.5 chloride 104 CO2 28 creatinine 0.9 potassium 4.3 sodium 138 and total protein 7.3.  January 17 ferritin level was normal at 119.96. January 17, 2023 iron sat was normal at 23%.  January 17 INR was 0.91. Stool study January 4 showed Giardia lamblia antigen not seen and cryptosporidium antigen not detected.  Hep A IgM was negative hep B surface antigen was negative.  Hep C antibody was negative.  Lipase was 26 antimitochondrial antibody was less than 20.  Ova and parasite test was noted to be negative from January 4. Stool studies for Salmonella, Shigella, Campylobacter and Shiga toxin were negative also from January 4.  January 5, 2023 ultrasound showed normal-sized liver contour and echogenicity no masses.  Main portal vein hepatic vein patent with appropriate flow, bile duct was 4 mm gallbladder was normal with no stones.  Pancreas head and body normal tail not seen.  Right kidney 12.3 cm with 1.8 cm cyst left kidney 13.9 cm with 5.2 cm cyst.  Spleen normal size.  Cyst felt to be simple.  There is a note here from Dr. Dwight Lucia the mentions that the patient has a mitochondrial disorder and a secondary muscle weakness and fatigue and history of rhabdomyolysis.  This note was from October 23, 2013.  They felt that he had stabilized with the introduction of coenzyme Q 10 and COVID-19.  I saw a note on January 6, 2023 from Dr. Lucia where she felt that the patient should avoid Cipro and Flagyl and Protonix should be okay with his condition.    Plan 1. He did well and is close to baseline and he will send us labs next week and since doing well plan in 1 yr. 2. Pt will consider hep b core total. 3. Pt will see us in a year as doing well. 4. Welcome to be seen sooner.  Duration of the visit was 32  min with 10 min of chart prep and 22 minutes for this face to face visit with time reviewing their prior and recent records and labs and discussing their current status and future plans for care for the patient.

## 2023-07-26 ENCOUNTER — TELEPHONE ENCOUNTER (OUTPATIENT)
Dept: URBAN - METROPOLITAN AREA CLINIC 86 | Facility: CLINIC | Age: 58
End: 2023-07-26

## 2023-07-26 NOTE — HPI-TODAY'S VISIT:
Pt called and we discussed that we are not an expert on this. I can ask to see who else he could see re this issue but suspect it is a difficult decision that would involve better a colorectal surgeon to comment on the best guidance for the pt on the neuroendocrine tumor tx. The note from oncology discusses the pros and cons of options and he feels conflicted as diff from what his gastro recommends. I advised this is outside my expertise and that I would recommend he see a colorectal specialist here at [place] or elsewhere for an opinion. Dr Lima

## 2023-12-25 ENCOUNTER — LAB OUTSIDE AN ENCOUNTER (OUTPATIENT)
Dept: URBAN - METROPOLITAN AREA TELEHEALTH 2 | Facility: TELEHEALTH | Age: 58
End: 2023-12-25

## 2024-01-14 ENCOUNTER — WEB ENCOUNTER (OUTPATIENT)
Dept: URBAN - METROPOLITAN AREA CLINIC 92 | Facility: CLINIC | Age: 59
End: 2024-01-14

## 2024-01-18 ENCOUNTER — OFFICE VISIT (OUTPATIENT)
Dept: URBAN - METROPOLITAN AREA TELEHEALTH 2 | Facility: TELEHEALTH | Age: 59
End: 2024-01-18

## 2024-01-18 RX ORDER — MECOBALAMIN 5000 MCG
0.5 LOZENGE ORAL PRN
Status: ACTIVE | COMMUNITY

## 2024-01-18 RX ORDER — PHENOL 1.4 %
AS DIRECTED AEROSOL, SPRAY (ML) MUCOUS MEMBRANE
Status: ACTIVE | COMMUNITY

## 2024-01-18 RX ORDER — GINSENG 100 MG
2 CAPSULE ORAL QD
Status: ACTIVE | COMMUNITY

## 2024-01-18 RX ORDER — LISINOPRIL AND HYDROCHLOROTHIAZIDE 20; 12.5 MG/1; MG/1
1 TABLET TABLET ORAL ONCE A DAY
Status: ACTIVE | COMMUNITY

## 2024-01-18 RX ORDER — ASPIRIN 81 MG/1
1 TABLET TABLET, COATED ORAL TWICE A DAY
Status: ACTIVE | COMMUNITY

## 2024-01-18 RX ORDER — LEVOCARNITINE 330 MG/1
3 TABLETS TABLET ORAL TWICE A DAY
Status: ACTIVE | COMMUNITY

## 2024-01-18 RX ORDER — ACETYLCYSTEINE 600 MG
AS DIRECTED CAPSULE ORAL ONCE A DAY
Status: ACTIVE | COMMUNITY

## 2024-01-18 RX ORDER — FLUTICASONE PROPIONATE 50 UG/1
1 SPRAY IN EACH NOSTRIL SPRAY, METERED NASAL ONCE A DAY
Status: ACTIVE | COMMUNITY

## 2024-01-18 NOTE — HPI-TODAY'S VISIT:
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 Prudencio Julien MD.  A copy of the note will be sent to the referring provider.  Pt is recooperating well from the HEV.  June 2023  colonoscopy report.  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  They want to do a follow-up flexible sigmoidoscopy to go back and cauterized the site and to do that in late July 2023.  The other polyp they state that was removed was an adenoma and for that they recommend a 5-year colonoscopy.   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    We will have this information added to your chart please thank Dr. Ruffin for sending this information to share.  EGD report from same day which stated that he had a normal esophagus, stomach and duodenum.  HEV : he is feels a chronic fatigue and so not able to easilyt separate the symptoms from that,  He understands that this is and acute hepatitis virus and it is not chronic unless ISP noted.  Needs labs and he will send us the labs.  April 5. Albumin normal at 4.0, alkaline phosphatase normal at 92.  ALT normal at 36 with ideal ALT less than 35. AST down to 23. Total bilirubin was 0.5 down from 0.8.  Direct bilirubin 0.04 so primarily indirect.  Total protein 6.6.  ALBUMIN  4.0 ALBUMIN/GLOBULIN RATIO, SERUM/PLASMA  1.2 ALKALINE PHOSPHATASE   121 ALT  160.0 AST  34 BILIRUBIN, DIRECT  0.28 BILIRUBIN, TOTAL   1.2 TOTAL PROTEIN       7.3   February 22 which was a hep C PCR quantitative that came back negative to confirm that there was no acute hepatitis C as an issue. As you recall focus is now on hepatitis E which was since confirmed. Dr. Lima Dear   Hepatitis E IGM positive and hepatitis E IGG also positive and so this confirms that you had recent hepatitis E. Important to mention to others who attended that with you. Higher risk is reported to be for female patients who are pregnant at the time of the exposure.  Alpha one M1M1 normal and level of 190.  I believe this was the final pending test from February 22 which was a hep C PCR quantitative that came back negative to confirm that there was no acute hepatitis C as an issue.  Feb 22:  [place] negative 10.5 and rules out another immune issue.  ALT	684.0 IU/L	  AST	104 IU/L	  ALKALINE PHOSPHATASE	158 IU/L	  BILIRUBIN, TOTAL	1.4 mg/dL	  BILIRUBIN, DIRECT	0.48 mg/dL	  TOTAL PROTEIN	6.9 g/dl	  ALBUMIN	3.7 g/dl	  ALBUMIN/GLOBULIN RATIO, SERUM/PLASMA	1.2  Collected: 02/24/2023 2:35 PM from [facility]  Resulted: 02/24/2023 10:00 PM  February 22. Hep A IgM negative and had a immunity noted. Unclear if this was from prior hep A vaccination or natural exposure. Hep B immunity noted at 5898. Other tests still pending. , Feb 22: Labs came back showing a sodium 137 potassium 4.1 chloride 103 CO2 27 BUN of 14 creatinine 0.8 calcium 9.5 glucose normal at 86 albumin 3.9 alkaline phosphatase 198  ALT 1368 total bilirubin 2.3 which was elevated.  No comparison, back on February 20 your AST had been 812 and ALT 2280 and total bilirubin 3.8 so they are clearly dropping. WBC was 4.8 hemoglobin 15.5 hematocrit up a little 48.2 platelet  count 320 MCV 87.8.  INR normal at 0.99 which is very important.  Your ammonia level was normal at 53. Neutrophils were 2.44 lymphocytes 1.88. Meld 10 and meld na 10. We also see the other test that are pending which include the hep B immunity as well as hep A immunity checks, the alpha-1 phenotype, the hep C screen, and the hepatitis E test as well.  February 22.  Hep A IgM negative and had a immunity noted.  Unclear if this was from prior hep A vaccination or natural exposure. Hep B immunity noted at 5898.   Prior Approximately 40 pages of material was reviewed. The patient is listed as having had a recent gastrointestinal illness end of dec 2022 and mentioned to their doctor here. While in [place] during the week of December.  Local doctor in [place] gave some abx and no sulfa due to mitochondrial issue and hydrated and felt better. Back and saw primary. Had been given a antibiotic that appeared to be cefixime 3 times a day for 5 days. Still stomach issues and lfts up and gave ppi and sent Dr Blum at . Had some bloating issues.    He saw Dr. Hunt and had some labs done and looked little lower and stool studies were negative and liver tests were still up a little bit. Stopped protonix pst 10 days. Feeling better.  He had an ultrasound of the liver that was done that was reported to be negative.  He was placed on Protonix and metronidazole but was not using the metronidazole.  Symptoms were improving.  Home COVID test have been negative. KP pcr also neg.  He also had upper respiratory symptoms after returning.    Then went to [place] last week and says was cold and mitochondrial issue gets worse muscles with cold and not usual symptoms and fatigue and went back in last week and saw doctor covering and did labs and labs up.  Cpk was done and negative per pt. So sent in Dr Hillman told him to come see for this.  Had street food.  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  Past surgeries include wisdom teeth extraction 1988. Colonoscopy april 12 2023. He did cologuard.  Medicines include pantoprazole 40 mg a day, levocarnitine 330 mg a day, lisinopril hydrochlorothiazide 20/12.5 once a day, multivitamin 50+ a day, Nasarel 200 mg a day, co-Q10 60 mg a day, aspirin 81 mg a day, B12 2500 mcg sublingual a day, Flonase 50 mcg nasal spray as needed.  Allergies are to Cipro and penicillin class.  Patient BMI listed as being obese at 30.9 with a weight of 191 and height of 5 foot 6.  Losing weight on purpose and stomach issues.  January 5, 2023 labs showed sodium 141 potassium 3.9 chloride 102 CO2 of 32 BUN of 20 creatinine 0.8 albumin 4.2 bilirubin 0.7 AST 69 ALT 63 alk phos 82 white cell count 6.7 hemoglobin 16.2 platelet count 377.  January 5, 2023 ultrasound showed patient had a normal size liver contour and echogenicity no masses.  Main portal vein and hepatic veins patent.  Bile ducts were normal common bile duct 4 mm.  Gallbladder was normal with no gallstones.  Pancreas head and body appeared normal tail not seen due to gas.  Right kidney 12.3 cm with no hydronephrosis.  Thin-walled anechoic cyst seen in the lateral cortex of the right kidney measuring 1.8 x 1.2 x 1.5 cm.  No stones.  Left kidney 13.9 cm within walled cyst noted 5.2 x 4.2 x 5.2 cm.  Spleen was normal size.  No ascites was seen.  The cysts were felt to be simple.  It appears that the provider ordered a ferritin, ceruloplasmin alpha-1, iron sat, a COVID screen. They suspected a possible infection versus other effect. They had requested an EGD consideration. To do egd and colon in april.  [facility] note of February 2023 with results now show that the alpha-1 level was normal at 145.  Ceruloplasmin was 22.  C. difficile toxin/PCR was negative.  We saw additional labs from February 20, 2023 that showed an albumin of 4.1 alkaline phosphatase 217 AST that was noted to be 812 ALT 2280 total bilirubin 3.8.  January 13, 2023 labs showed AST 30 ALT 59 alk phos 75 albumin 4.1 bilirubin 0.4.  January 5, 2023 labs showed bilirubin 0.7 AST 69 ALT 63 alk phos 82. January 3, 2023 labs showed bilirubin 0.5 AST 51 ALT 51 alk phos 78.  September 2, 2022 labs showed bilirubin 0.5 AST 17 ALT 24 alk phos 78.  Clearly a change noted.   GI February 20th 2023 labs showed calcium 9.5 chloride 104 CO2 28 creatinine 0.9 potassium 4.3 sodium 138 and total protein 7.3.  January 17 ferritin level was normal at 119.96. January 17, 2023 iron sat was normal at 23%.  January 17 INR was 0.91. Stool study January 4 showed Giardia lamblia antigen not seen and cryptosporidium antigen not detected.  Hep A IgM was negative hep B surface antigen was negative.  Hep C antibody was negative.  Lipase was 26 antimitochondrial antibody was less than 20.  Ova and parasite test was noted to be negative from January 4. Stool studies for Salmonella, Shigella, Campylobacter and Shiga toxin were negative also from January 4.  January 5, 2023 ultrasound showed normal-sized liver contour and echogenicity no masses.  Main portal vein hepatic vein patent with appropriate flow, bile duct was 4 mm gallbladder was normal with no stones.  Pancreas head and body normal tail not seen.  Right kidney 12.3 cm with 1.8 cm cyst left kidney 13.9 cm with 5.2 cm cyst.  Spleen normal size.  Cyst felt to be simple.  There is a note here from Dr. Dwight Lucia the mentions that the patient has a mitochondrial disorder and a secondary muscle weakness and fatigue and history of rhabdomyolysis.  This note was from October 23, 2013.  They felt that he had stabilized with the introduction of coenzyme Q 10 and COVID-19.  I saw a note on January 6, 2023 from Dr. Lucia where she felt that the patient should avoid Cipro and Flagyl and Protonix should be okay with his condition.    Plan 1. He did well and is close to baseline and he will send us labs next week and since doing well plan in 1 yr. 2. Pt will consider hep b core total. 3. Pt will see us in a year as doing well. 4. Welcome to be seen sooner.  Duration of the visit was min with 10 min of chart prep and 22 minutes for this face to face visit with time reviewing their prior and recent records and labs and discussing their current status and future plans for care for the patient.